# Patient Record
Sex: FEMALE | Race: WHITE
[De-identification: names, ages, dates, MRNs, and addresses within clinical notes are randomized per-mention and may not be internally consistent; named-entity substitution may affect disease eponyms.]

---

## 2019-10-18 ENCOUNTER — HOSPITAL ENCOUNTER (EMERGENCY)
Dept: HOSPITAL 54 - ER | Age: 54
Discharge: LEFT BEFORE DISCHARGE | End: 2019-10-18
Payer: MEDICARE

## 2019-10-18 VITALS — DIASTOLIC BLOOD PRESSURE: 97 MMHG | SYSTOLIC BLOOD PRESSURE: 144 MMHG

## 2019-10-18 VITALS — HEIGHT: 68 IN | BODY MASS INDEX: 21.22 KG/M2 | WEIGHT: 140 LBS

## 2019-10-18 DIAGNOSIS — I10: ICD-10-CM

## 2019-10-18 DIAGNOSIS — R55: Primary | ICD-10-CM

## 2019-10-18 DIAGNOSIS — R56.9: ICD-10-CM

## 2019-10-18 DIAGNOSIS — Z88.8: ICD-10-CM

## 2019-10-18 NOTE — NUR
PT [id number] FOR WITNESSED SEIZURE BY BF AND DAUGHTER, APPROX 3 MIN, PER EMS, PT 
FELL 3 DAYS AGO, POSTICTAL EN ROUTE. PT AAOX4, VSS. DENIES ANY DISCOMFORT. SEEN 
& EVAL'D BY DR. VERAS AND PT SIGNED AMA FORM. PT AMB UPON LEAVING ED.